# Patient Record
Sex: FEMALE | Race: WHITE | NOT HISPANIC OR LATINO | Employment: FULL TIME | URBAN - METROPOLITAN AREA
[De-identification: names, ages, dates, MRNs, and addresses within clinical notes are randomized per-mention and may not be internally consistent; named-entity substitution may affect disease eponyms.]

---

## 2019-07-04 ENCOUNTER — HOSPITAL ENCOUNTER (EMERGENCY)
Facility: HOSPITAL | Age: 39
Discharge: HOME | End: 2019-07-04
Attending: EMERGENCY MEDICINE
Payer: COMMERCIAL

## 2019-07-04 ENCOUNTER — APPOINTMENT (EMERGENCY)
Dept: RADIOLOGY | Facility: HOSPITAL | Age: 39
End: 2019-07-04
Payer: COMMERCIAL

## 2019-07-04 VITALS
HEART RATE: 102 BPM | DIASTOLIC BLOOD PRESSURE: 71 MMHG | TEMPERATURE: 98.9 F | RESPIRATION RATE: 16 BRPM | SYSTOLIC BLOOD PRESSURE: 138 MMHG | WEIGHT: 141.09 LBS | OXYGEN SATURATION: 100 %

## 2019-07-04 DIAGNOSIS — S92.354A CLOSED NONDISPLACED FRACTURE OF FIFTH METATARSAL BONE OF RIGHT FOOT, INITIAL ENCOUNTER: Primary | ICD-10-CM

## 2019-07-04 LAB
EXT PREG TEST URINE: NEGATIVE
EXT. CONTROL ED NAV: NORMAL

## 2019-07-04 PROCEDURE — 81025 URINE PREGNANCY TEST: CPT

## 2019-07-04 PROCEDURE — 99283 EMERGENCY DEPT VISIT LOW MDM: CPT | Performed by: EMERGENCY MEDICINE

## 2019-07-04 PROCEDURE — 73630 X-RAY EXAM OF FOOT: CPT

## 2019-07-04 PROCEDURE — 99284 EMERGENCY DEPT VISIT MOD MDM: CPT

## 2019-07-04 PROCEDURE — 73610 X-RAY EXAM OF ANKLE: CPT

## 2019-07-04 RX ORDER — HYDROCODONE BITARTRATE AND ACETAMINOPHEN 5; 325 MG/1; MG/1
1 TABLET ORAL EVERY 6 HOURS PRN
Qty: 10 TABLET | Refills: 0 | Status: SHIPPED | OUTPATIENT
Start: 2019-07-04 | End: 2019-07-07

## 2019-07-04 RX ORDER — NAPROXEN 250 MG/1
500 TABLET ORAL ONCE
Status: COMPLETED | OUTPATIENT
Start: 2019-07-04 | End: 2019-07-04

## 2019-07-04 RX ORDER — NAPROXEN 500 MG/1
500 TABLET ORAL 2 TIMES DAILY WITH MEALS
Qty: 20 TABLET | Refills: 0 | Status: SHIPPED | OUTPATIENT
Start: 2019-07-04 | End: 2019-07-14

## 2019-07-04 RX ORDER — HYDROCODONE BITARTRATE AND ACETAMINOPHEN 5; 325 MG/1; MG/1
1 TABLET ORAL ONCE
Status: COMPLETED | OUTPATIENT
Start: 2019-07-04 | End: 2019-07-04

## 2019-07-04 RX ADMIN — HYDROCODONE BITARTRATE AND ACETAMINOPHEN 1 TABLET: 5; 325 TABLET ORAL at 15:41

## 2019-07-04 RX ADMIN — NAPROXEN 500 MG: 250 TABLET ORAL at 15:41

## 2019-07-04 NOTE — DISCHARGE INSTRUCTIONS
As instructed stay off of the foot and use the crutches, your to follow up with Orthopedics within 1 week for re-evaluation for further treatment and management of her foot fracture (break ) as instructed as discussed

## 2019-07-04 NOTE — ED PROVIDER NOTES
History  Chief Complaint   Patient presents with    Ankle Injury     right ankle roll     42-year-old female history of celiac disease here for evaluation right ankle and foot injury  Immediately prior to arrival the patient was walking into a restaurant, she reports that she tripped on a uneven surface at the restaurant property inverting her right ankle and causing her to stumble she did not fall to the ground strike her head or lose consciousness  She was initially able to ambulate sat down at the restaurant notes that she had significant swelling over lateral aspect of her right foot  Ambulance was called she has brought the emergency department for further evaluation where she notes that she has pain and swelling localized to her right proximal lateral foot pain radiates into the immediate lateral ankle it is worse than bili shin although again she is able to ambulate is better held in a position of comfort she has no weakness paresthesias or anesthesia she has no leg or knee pain she has no other injuries complaints or concerns otherwise denies a complete review systems as noted          None       Past Medical History:   Diagnosis Date    Celiac disease        History reviewed  No pertinent surgical history  History reviewed  No pertinent family history  I have reviewed and agree with the history as documented  Social History     Tobacco Use    Smoking status: Never Smoker    Smokeless tobacco: Never Used   Substance Use Topics    Alcohol use: Yes     Comment: social    Drug use: Never        Review of Systems   Constitutional: Negative for activity change, appetite change, fatigue and fever  Gastrointestinal: Negative for nausea and vomiting  Genitourinary: Negative for flank pain and menstrual problem  Musculoskeletal: Positive for gait problem and joint swelling  Negative for back pain, neck pain and neck stiffness  Right foot pain     Skin: Positive for color change   Negative for pallor, rash and wound  Neurological: Negative for weakness, numbness and headaches  Hematological: Negative for adenopathy  Does not bruise/bleed easily  Psychiatric/Behavioral: Negative for behavioral problems  All other systems reviewed and are negative  Physical Exam  Physical Exam   Constitutional: She is oriented to person, place, and time  She appears well-developed and well-nourished  No distress  Clinically well-appearing no acute distress   HENT:   Head: Normocephalic and atraumatic  Eyes: Pupils are equal, round, and reactive to light  EOM are normal    Neck: Normal range of motion  Neck supple  No tracheal deviation present  Cardiovascular: Normal rate, regular rhythm and normal heart sounds  Exam reveals no gallop and no friction rub  No murmur heard  Pulmonary/Chest: Effort normal and breath sounds normal  She has no wheezes  She has no rales  Musculoskeletal:   Patient's muscle skeletal exam significant for mild-to-moderate swelling over lateral aspect of her right proximal foot, skin is intact circumferentially she is tender primarily to this area only there is no tenderness over the inferior aspect or over the lateral malleolus there is no tenderness or swelling over the medial malleolus there is no tender notes or swelling or deformity the heel posterior ankle or Achilles tendon, plantar flexion and dorsiflexion is intact, there is no plantar ecchymosis or distal foot tenderness distal foot is neurovascularly intact cap refill is brisk sensations intact, again skin is intact there is no tenderness of the remainder of the leg or ankle there is no tenderness of the fibular head quadriceps is intact she has no pain with range of motion of the right hip no other acute ischemic infectious inflammatory traumatic findings on exam   Neurological: She is alert and oriented to person, place, and time  No cranial nerve deficit  She exhibits normal muscle tone   Coordination normal  Skin: Skin is warm and dry  No rash noted  Psychiatric: She has a normal mood and affect  Her behavior is normal    Nursing note and vitals reviewed  Vital Signs  ED Triage Vitals [07/04/19 1524]   Temperature Pulse Respirations Blood Pressure SpO2   98 9 °F (37 2 °C) 102 16 138/71 100 %      Temp Source Heart Rate Source Patient Position - Orthostatic VS BP Location FiO2 (%)   Oral -- -- -- --      Pain Score       7           Vitals:    07/04/19 1524   BP: 138/71   Pulse: 102         Visual Acuity      ED Medications  Medications   HYDROcodone-acetaminophen (NORCO) 5-325 mg per tablet 1 tablet (1 tablet Oral Given 7/4/19 1541)   naproxen (NAPROSYN) tablet 500 mg (500 mg Oral Given 7/4/19 1541)       Diagnostic Studies  Results Reviewed     Procedure Component Value Units Date/Time    POCT pregnancy, urine [[id number]]  (Normal) Resulted:  07/04/19 1546    Lab Status:  Final result Updated:  07/04/19 1546     EXT PREG TEST UR (Ref: Negative) negative     Control valid                 XR foot 3+ views RIGHT   ED Interpretation by Micheal Brady DO (07/04 1610)   Fracture the base of the 5th metatarsal      Final Result by Manuel Barrios DO (07/05 0745)      Transverse fracture base of the 5th metatarsal             Workstation performed: [id number]         XR ankle 3+ views RIGHT   ED Interpretation by Micheal Brady DO (07/04 1611)   Fracture of the base of the 5th metatarsal      Final Result by Manuel Barrios DO (07/05 0745)      No acute osseous abnormality              Workstation performed: [id number]                    Procedures  Procedures       ED Course  ED Course as of Jul 05 1501   Thu Jul 04, 2019   1620 Patient instructed not to drive she has a proximal 5th metatarsal fracture she will be made nonweightbearing placed in posterior Ortho Glass splint crutches pain control ice pack she understands she is to follow up with Orthopedics when she returns home to [place] later today to be seen within a week for further treatment and management which may include immobilization answer or surgery, understands agrees to discharge return follow-up instructions patient given CD with images for follow-up      1625 Patient placed in right posterior Ortho Glass splint neurovascularly intact after splint application understands agrees to discharge return follow-up instructions                                  MDM    Disposition  Final diagnoses:   Closed nondisplaced fracture of fifth metatarsal bone of right foot, initial encounter     Time reflects when diagnosis was documented in both MDM as applicable and the Disposition within this note     Time User Action Codes Description Comment    7/4/2019  4:23 PM Kathryn, [address] [facility] Closed nondisplaced fracture of fifth metatarsal bone of right foot, initial encounter       ED Disposition     ED Disposition Condition Date/Time Comment    Discharge Stable Thu Jul 4, 2019  4:22 PM Wendy Canales discharge to home/self care  Follow-up Information     Follow up With Specialties Details Why Contact Info Additional Information    [address] [facility] Emergency Department Emergency Medicine  If symptoms worsen [address] [facility] [place] [facility] [address], [address] [place], [place], [facility]          Discharge Medication List as of 7/4/2019  4:25 PM      START taking these medications    Details   HYDROcodone-acetaminophen (NORCO) 5-325 mg per tablet Take 1 tablet by mouth every 6 (six) hours as needed for pain for up to 3 daysMax Daily Amount: 4 tablets, Starting Thu 7/4/2019, Until Sun 7/7/2019, Print      naproxen (NAPROSYN) 500 mg tablet Take 1 tablet (500 mg total) by mouth 2 (two) times a day with meals for 10 days, Starting Thu 7/4/2019, Until Sun 7/14/2019, Print           No discharge procedures on file      ED Provider  Electronically Signed by           Lindy Driver DO  07/05/19 1500